# Patient Record
Sex: FEMALE | Race: WHITE | NOT HISPANIC OR LATINO | URBAN - METROPOLITAN AREA
[De-identification: names, ages, dates, MRNs, and addresses within clinical notes are randomized per-mention and may not be internally consistent; named-entity substitution may affect disease eponyms.]

---
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
## 2018-02-11 ENCOUNTER — COMMUNICATION - RIVER FALLS (OUTPATIENT)
Dept: FAMILY MEDICINE | Facility: CLINIC | Age: 18
End: 2018-02-11

## 2018-02-11 ENCOUNTER — OFFICE VISIT - RIVER FALLS (OUTPATIENT)
Dept: FAMILY MEDICINE | Facility: CLINIC | Age: 18
End: 2018-02-11

## 2018-02-11 LAB
CREAT SERPL-MCNC: 0.75 MG/DL (ref 0.57–1.11)
GLUCOSE BLD-MCNC: 96 MG/DL (ref 65–100)

## 2018-02-12 ENCOUNTER — COMMUNICATION - RIVER FALLS (OUTPATIENT)
Dept: FAMILY MEDICINE | Facility: CLINIC | Age: 18
End: 2018-02-12

## 2018-05-02 ENCOUNTER — OFFICE VISIT - RIVER FALLS (OUTPATIENT)
Dept: FAMILY MEDICINE | Facility: CLINIC | Age: 18
End: 2018-05-02

## 2018-05-02 ASSESSMENT — MIFFLIN-ST. JEOR: SCORE: 2146.15

## 2022-02-11 VITALS
BODY MASS INDEX: 41.37 KG/M2 | WEIGHT: 289 LBS | DIASTOLIC BLOOD PRESSURE: 74 MMHG | HEART RATE: 95 BPM | SYSTOLIC BLOOD PRESSURE: 124 MMHG | TEMPERATURE: 98.7 F | OXYGEN SATURATION: 98 % | HEIGHT: 70 IN

## 2022-02-11 VITALS
DIASTOLIC BLOOD PRESSURE: 70 MMHG | HEIGHT: 70 IN | WEIGHT: 280 LBS | WEIGHT: 275.4 LBS | HEIGHT: 70 IN | HEIGHT: 70 IN | DIASTOLIC BLOOD PRESSURE: 72 MMHG | HEART RATE: 84 BPM | DIASTOLIC BLOOD PRESSURE: 78 MMHG | BODY MASS INDEX: 39.43 KG/M2 | TEMPERATURE: 97.7 F | HEART RATE: 82 BPM | WEIGHT: 276 LBS | SYSTOLIC BLOOD PRESSURE: 124 MMHG | HEIGHT: 70 IN | SYSTOLIC BLOOD PRESSURE: 124 MMHG | HEART RATE: 66 BPM | SYSTOLIC BLOOD PRESSURE: 116 MMHG | BODY MASS INDEX: 39.51 KG/M2 | BODY MASS INDEX: 39.51 KG/M2 | HEART RATE: 90 BPM | OXYGEN SATURATION: 98 % | WEIGHT: 276 LBS | BODY MASS INDEX: 40.23 KG/M2 | WEIGHT: 276 LBS | HEIGHT: 70 IN | SYSTOLIC BLOOD PRESSURE: 120 MMHG | HEIGHT: 70 IN | HEART RATE: 72 BPM | WEIGHT: 281 LBS | HEART RATE: 68 BPM | DIASTOLIC BLOOD PRESSURE: 72 MMHG | SYSTOLIC BLOOD PRESSURE: 124 MMHG | BODY MASS INDEX: 39.51 KG/M2 | BODY MASS INDEX: 40.09 KG/M2 | TEMPERATURE: 97.9 F | DIASTOLIC BLOOD PRESSURE: 60 MMHG | SYSTOLIC BLOOD PRESSURE: 102 MMHG | TEMPERATURE: 98.1 F | DIASTOLIC BLOOD PRESSURE: 66 MMHG

## 2022-02-11 VITALS
HEIGHT: 70 IN | DIASTOLIC BLOOD PRESSURE: 70 MMHG | SYSTOLIC BLOOD PRESSURE: 121 MMHG | HEART RATE: 97 BPM | SYSTOLIC BLOOD PRESSURE: 144 MMHG | SYSTOLIC BLOOD PRESSURE: 118 MMHG | BODY MASS INDEX: 41.37 KG/M2 | SYSTOLIC BLOOD PRESSURE: 128 MMHG | HEIGHT: 70 IN | HEART RATE: 88 BPM | BODY MASS INDEX: 41.37 KG/M2 | BODY MASS INDEX: 41.52 KG/M2 | TEMPERATURE: 98.2 F | OXYGEN SATURATION: 98 % | TEMPERATURE: 97.3 F | TEMPERATURE: 97.4 F | WEIGHT: 289 LBS | DIASTOLIC BLOOD PRESSURE: 84 MMHG | HEIGHT: 70 IN | WEIGHT: 290 LBS | DIASTOLIC BLOOD PRESSURE: 68 MMHG | HEART RATE: 88 BPM | HEART RATE: 72 BPM | WEIGHT: 290 LBS | WEIGHT: 289 LBS | DIASTOLIC BLOOD PRESSURE: 80 MMHG | BODY MASS INDEX: 41.61 KG/M2

## 2022-02-11 VITALS
SYSTOLIC BLOOD PRESSURE: 112 MMHG | TEMPERATURE: 98.1 F | BODY MASS INDEX: 39.51 KG/M2 | HEIGHT: 70 IN | DIASTOLIC BLOOD PRESSURE: 76 MMHG | TEMPERATURE: 98.6 F | HEART RATE: 76 BPM | WEIGHT: 284 LBS | DIASTOLIC BLOOD PRESSURE: 80 MMHG | OXYGEN SATURATION: 98 % | HEART RATE: 72 BPM | BODY MASS INDEX: 40.75 KG/M2 | WEIGHT: 276 LBS | SYSTOLIC BLOOD PRESSURE: 142 MMHG

## 2022-02-11 VITALS
SYSTOLIC BLOOD PRESSURE: 114 MMHG | BODY MASS INDEX: 41.95 KG/M2 | WEIGHT: 293 LBS | DIASTOLIC BLOOD PRESSURE: 68 MMHG | BODY MASS INDEX: 42.99 KG/M2 | HEART RATE: 74 BPM | DIASTOLIC BLOOD PRESSURE: 88 MMHG | SYSTOLIC BLOOD PRESSURE: 140 MMHG | WEIGHT: 293 LBS | HEART RATE: 76 BPM | TEMPERATURE: 97.9 F | HEIGHT: 70 IN

## 2022-02-16 NOTE — PROGRESS NOTES
Patient:   JESUS YU            MRN: [id number]            FIN: [id number]               Age:   16 years     Sex:  Female     :  2000   Associated Diagnoses:   None   Author:   Ted Loya MD      Visit Information      Date of Service: 2017 02:06 pm  Performing Location: [facility]  Encounter#: [id number]      Primary Care Provider (PCP):  Not recorded.      Referring Provider:  No referring provider recorded for selected visit.      Chief Complaint   2017 2:19 PM CST     F/u Depression        History of Present Illness   Pt has been taking 40 mg citalopram for 6 wks and has noted no improvement. Has some passive death wish thoughts occasionally which are stable, but no active plan. She has started binge drinking on the weekends. She is still having a lot of trouble sleeping.       Review of Systems         No gallo  No hallucinations  No clear signs of ADD      Health Status   Allergies:    Allergic Reactions (Selected)  No Known Medication Allergies   Medications:  (Selected)   Prescriptions  Prescribed  Effexor XR 75 mg oral capsule, extended release: 1 cap(s) ( 75 mg ), PO, Daily, # 30 cap(s), 0 Refill(s), Type: Maintenance, Pharmacy: [facility] #[id number], 1 cap(s) po daily  citalopram 20 mg oral tablet: 2 tab(s) ( 40 mg ), po, daily, # 60 tab(s), 0 Refill(s), Type: Soft Stop, Pharmacy: [facility] #[id number], 2 tab(s) po daily  ramelteon 8 mg oral tablet: 1 tab(s) ( 8 mg ), po, hs, PRN: as needed for insomnia, # 30 tab(s), 0 Refill(s), Type: Maintenance, Pharmacy: [facility] #[id number], 1 tab(s) po hs,PRN:as needed for insomnia   Problem list:    All Problems  Obesity / SNOMED CT 0062646450 / Probable  Seasonal allergies / SNOMED CT 527760932 / Confirmed      Histories   Past Medical History:    Active  Seasonal allergies (219504613)  Resolved  History of chicken pox ([id number]--[id number]):  Resolved.   Family History:    Multiple sclerosis  Mother      Procedure history:    Arthroscopic repair of superior labrum anterior to posterior tear ([id number]) in the month of 8/2016 at 16 Years.  Comments:  10/18/2016 4:17 PM - Javon MARTINESJulio  Bilaterally  Extraction of wisdom tooth ([id number]).   Social History:        Alcohol Assessment: Denies Alcohol Use            Never      Tobacco Assessment: Denies Tobacco Use            Never smoker      Substance Abuse Assessment: Denies Substance Abuse            Never      Employment and Education Assessment            Student                     Comments:                      10/18/2016 - Julio Alejandro CMA                     Freshman at [employer]      Exercise and Physical Activity Assessment: Regular exercise            Exercise frequency: 5-6 times/week.  Exercise type: Track & Field at [employer].        Physical Examination   Vital Signs   2/7/2017 2:19 PM CST Peripheral Pulse Rate 66 bpm    Systolic Blood Pressure 124 mmHg    Diastolic Blood Pressure 72 mmHg    Mean Arterial Pressure 89 mmHg      Measurements from flowsheet : Measurements   2/7/2017 2:19 PM CST Height Measured - Standard 70 in    Weight Measured - Standard 281.0 lb    BSA 2.51 m2    Body Mass Index 40.31 kg/m2    Body Mass Index Percentile 98.67      Psych - appropriate affect, calm , and cooperative      Impression and Plan   Depression - refractory to SSRI treatment.  - countinue counseling. She is going weekly  - Discussed medicatino options for patient. Discussed switching to mirtazapine to help with sleep but discussed likely adverse effect of wt gain which pt thinks would be unacceptbale side effect for her. Which would also limit the use of augmentation with an atypical antipsychotic,.   - will switch to venlafaxine. Decrease citalopram to 20 mg daily over the next couple of weeks 2while taking 75 mg effexor XR  - f/u 2 wks  - try ramelteon for sleep qhs.   - dsicussed imporatnce of alchool avoidance.       Professional Services   Counseling  Summary:  This was a 20 minute visit with greater than 50% of that time spent counseling the patient.    Counseling included treatment options, and lifestyle changes.

## 2022-02-16 NOTE — PROGRESS NOTES
Patient:   JESUS YU            MRN: [id number]            FIN: [id number]               Age:   16 years     Sex:  Female     :  2000   Associated Diagnoses:   Concussion   Author:   Riky Pineda MD      Chief Complaint   2017 10:13 AM CST   Here today stating that although she had been improving sx are worsening now.  Headache, some light and sound sensitivity from time to time.      History of Present Illness   see chief complaint as noted above and confirmed with the patient     16 year old female here today following up with concussion that occured about two weeks ago. She was getting much better, her headaches were slowly getting better. On Monday she felt really good her headaches were pretty much gone, [facility] and did okay. On Tuesday she was told by her  if she can go the day symptom free then she could start biking soon, on Tuesday night she was with her friends and she got a bad headache so she went to lie down, when she did go to sleep she say's she slept for seventeen hours,  then on Wednesday night she only slept for about one hour. She feels the headaches are still present, she has been taking aleve and says this gives her some relief for a few hours.       Review of Systems   Constitutional:  No fever.    Eye:  No double vision, No visual disturbances.    Ear/Nose/Mouth/Throat:  No sore throat.    Respiratory:  No cough.    Gastrointestinal:  No nausea, No vomiting, No diarrhea.    Integumentary:  No rash.    Neurologic:  Alert and oriented X4, Headache, No abnormal balance.       Health Status   Allergies:    Allergic Reactions (Selected)  No Known Medication Allergies   Medications:  (Selected)   Prescriptions  Prescribed  Effexor  mg oral capsule, extended release: 1 cap(s) ( 150 mg ), PO, Daily, # 30 cap(s), 0 Refill(s), Type: Maintenance, Pharmacy: [facility] #[id number], 1 cap(s) po daily  ramelteon 8 mg oral tablet: 1 tab(s) ( 8 mg ), po, hs,  PRN: as needed for insomnia, # 30 tab(s), 0 Refill(s), Type: Maintenance, Pharmacy: [facility] #[id number], 1 tab(s) po hs,PRN:as needed for insomnia   Problem list:    All Problems  Obesity / SNOMED CT 2062727719 / Probable  Seasonal allergies / SNOMED CT 967057453 / Confirmed  Resolved: History of chicken pox / SNOMED CT F022ZAX7-8926--[id number]      Histories   Past Medical History:    Active  Seasonal allergies (654876155)  Resolved  History of chicken pox ([id number]--[id number]):  Resolved.   Family History:    Multiple sclerosis  Mother     Procedure history:    Arthroscopic repair of superior labrum anterior to posterior tear (9817601360) in the month of 8/2016 at 16 Years.  Comments:  10/18/2016 4:17 PM - Julio Alejandro CMA  Bilaterally  Extraction of wisdom tooth ([id number]).   Social History:        Alcohol Assessment: Denies Alcohol Use            Never      Tobacco Assessment: Denies Tobacco Use            Never smoker      Substance Abuse Assessment: Denies Substance Abuse            Never      Employment and Education Assessment            Student                     Comments:                      10/18/2016 - Julio Alejandro CMA                     Freshman at [employer]      Exercise and Physical Activity Assessment: Regular exercise            Exercise frequency: 5-6 times/week.  Exercise type: Track & Field at college.        Physical Examination   Vital Signs   2/23/2017 10:13 AM CST Temperature Temporal 97.7 DegF    Apical Heart Rate 84 bpm    Systolic Blood Pressure 116 mmHg    Diastolic Blood Pressure 60 mmHg    Mean Arterial Pressure 79 mmHg      Measurements from flowsheet : Measurements   2/23/2017 10:13 AM CST Height Measured - Standard 70 in    Weight Measured - Standard 276 lb    BSA 2.48 m2    Body Mass Index 39.6 kg/m2    Body Mass Index Percentile 98.55      General:  Alert and oriented, No acute distress.    Eye:  Pupils are equal, round and reactive to light,  Normal conjunctiva.    HENT:  Oral mucosa is moist.    Neck:  Supple.    Respiratory:  Respirations are non-labored.    Cardiovascular:  Normal rate, Regular rhythm, No edema.    Gastrointestinal:  Non-distended.    Musculoskeletal:  Normal gait.    Integumentary:  Warm, No rash.    Psychiatric:  Cooperative, Appropriate mood & affect, Normal judgment.       Review / Management   Results review      Impression and Plan       Diagnosis     Concussion ([id number]-NK S06.0X).     Course:  main problem is headache.  unsure if she has acute headache from insomnia or is concussion related  reviewed activity level and she will set llimits.    Plan:  Will send in Prednisone for 5 days, this will help with any inflammation.     Neurontin will be sent in as well, take this at night, it will begin to work in a few days.     Please return on Monday or Tuesday if things are worsening or not getting any better.     .    Meena GARCIA Medical Assistant acted solely as a scribe for, and in presence of Dr. Riky Pineda who performed the services.

## 2022-02-16 NOTE — PROGRESS NOTES
Patient:   JESUS YU            MRN: [id number]            FIN: [id number]               Age:   16 years     Sex:  Female     :  2000   Associated Diagnoses:   None   Author:   Ted Loya MD      Visit Information      Date of Service: 2017 03:44 pm  Performing Location: [facility]  Encounter#: [id number]      Primary Care Provider (PCP):  Ted Loya MD    NPI# [id number]      Referring Provider:  No referring provider recorded for selected visit.      Chief Complaint   2017 3:48 PM CST    Pt here for f/u medication for anxiety and depression.        History of Present Illness   CC as above and reviewed w  patient   She note some improvement. School is going better. Had a concusison, is improving. Sleep is a little improved in the sense that with ramelteon she now seems alessandro falling asleep, but she gets woken up easily by her roommate and then has trouble falling back again.      Review of Systems         Psych - denies increase in suicidal thoughts      Health Status   Allergies:    Allergic Reactions (Selected)  No Known Medication Allergies   Medications:  (Selected)   Prescriptions  Prescribed  Effexor  mg oral capsule, extended release: 1 cap(s) ( 150 mg ), PO, Daily, # 30 cap(s), 0 Refill(s), Type: Maintenance, Pharmacy: [facility] #[id number], 1 cap(s) po daily  ramelteon 8 mg oral tablet: 1 tab(s) ( 8 mg ), po, hs, PRN: as needed for insomnia, # 30 tab(s), 0 Refill(s), Type: Maintenance, Pharmacy: [facility] #[id number], 1 tab(s) po hs,PRN:as needed for insomnia   Problem list:    All Problems  Obesity / SNOMED CT 2046093468 / Probable  Seasonal allergies / SNOMED CT 940942314 / Confirmed      Histories   Past Medical History:    Active  Seasonal allergies (855235012)  Resolved  History of chicken pox ([id number]--[id number]):  Resolved.   Family History:    Multiple sclerosis  Mother     Procedure history:    Arthroscopic repair of  superior labrum anterior to posterior tear (3309253667) in the month of 8/2016 at 16 Years.  Comments:  10/18/2016 4:17 PM - Julio Alejandro CMA  Bilaterally  Extraction of wisdom tooth (642097526).   Social History:        Alcohol Assessment: Denies Alcohol Use            Never      Tobacco Assessment: Denies Tobacco Use            Never smoker      Substance Abuse Assessment: Denies Substance Abuse            Never      Employment and Education Assessment            Student                     Comments:                      10/18/2016 - Julio Alejandro CMA                     Freshman at [employer]      Exercise and Physical Activity Assessment: Regular exercise            Exercise frequency: 5-6 times/week.  Exercise type: Track & Field at [employer].        Physical Examination   Vital Signs   2/21/2017 3:48 PM CST Temperature Tympanic 97.9 DegF    Peripheral Pulse Rate 82 bpm    Systolic Blood Pressure 102 mmHg    Diastolic Blood Pressure 66 mmHg    Mean Arterial Pressure 78 mmHg      Measurements from flowsheet : Measurements   2/21/2017 3:48 PM CST Height Measured - Standard 70 in    Weight Measured - Standard 276 lb    BSA 2.48 m2    Body Mass Index 39.6 kg/m2    Body Mass Index Percentile 98.55      Gen - appears well  Psych - better affect, fair judgement, fiar insight      Impression and Plan   Depression  Insomnia    - continue rmalteon. Discussed earplugs  - stop citalopram, increase effexor to 150 mg daily. f/u 2-4 wks.

## 2022-02-16 NOTE — PROGRESS NOTES
Patient:   JESUS YU            MRN: [id number]            FIN: [id number]               Age:   16 years     Sex:  Female     :  2000   Associated Diagnoses:   Concussion   Author:   Riky Pineda MD      Chief Complaint   2017 3:47 PM CST    Here today to be evaluated for concussion.  [place] at school recommendedyou be evaluated. head injury 2016.  injury occured slipping on ice and hit railing.      History of Present Illness   see chief complaint as noted above and confirmed with the patient     16 year old female here today with concern of possible concussion. She slipped and fell on ice on Tuesday, she was evaluated by her brett and he said she had a concussion. She has been going to school yet has not been joining in on sports due to her concussion. She was told she needs to be rechecked by a physician, she has complaints of headache, exhaustion, she feels she could sleep for a very long time. She had a previous concussion in her gisel year of high school. She also recently hurt her ankle so has not been doing activites in class for a few day's now. She has been going to school and has not taken any days off due to the concussion.       Review of Systems   Constitutional:  Decreased activity, No fever, No weakness.    Eye:  No blurring, No double vision, No visual disturbances.    Ear/Nose/Mouth/Throat:  No nasal congestion, No sore throat.    Respiratory:  No cough, No wheezing.    Cardiovascular:  No chest pain.    Gastrointestinal:  Nausea, Had some nausea on saturday-has not had much since , No vomiting, No diarrhea.    Musculoskeletal:  No neck pain, No joint pain, No muscle pain.    Integumentary:  No rash.    Neurologic:  Alert and oriented X4, Headache, No abnormal balance, No confusion.       Health Status   Allergies:    Allergic Reactions (Selected)  No Known Medication Allergies   Medications:  (Selected)   Prescriptions  Prescribed  Effexor XR 75 mg oral capsule,  extended release: 1 cap(s) ( 75 mg ), PO, Daily, # 30 cap(s), 0 Refill(s), Type: Maintenance, Pharmacy: [facility] #[id number], 1 cap(s) po daily  citalopram 20 mg oral tablet: 2 tab(s) ( 40 mg ), po, daily, # 60 tab(s), 0 Refill(s), Type: Soft Stop, Pharmacy: [facility] #[id number], 2 tab(s) po daily  ramelteon 8 mg oral tablet: 1 tab(s) ( 8 mg ), po, hs, PRN: as needed for insomnia, # 30 tab(s), 0 Refill(s), Type: Maintenance, Pharmacy: [facility] #[id number], 1 tab(s) po hs,PRN:as needed for insomnia   Problem list:    All Problems  Obesity / SNOMED CT 8216283895 / Probable  Seasonal allergies / SNOMED CT 809346993 / Confirmed  Resolved: History of chicken pox / SNOMED CT L701DMM8-1126--[id number]      Histories   Past Medical History:    Active  Seasonal allergies (706729365)  Resolved  History of chicken pox ([id number]--[id number]):  Resolved.   Family History:    Multiple sclerosis  Mother     Procedure history:    Arthroscopic repair of superior labrum anterior to posterior tear (4469067942) in the month of 8/2016 at 16 Years.  Comments:  10/18/2016 4:17 PM - Julio Alejandro CMA  Bilaterally  Extraction of wisdom tooth (071265939).   Social History:        Alcohol Assessment: Denies Alcohol Use            Never      Tobacco Assessment: Denies Tobacco Use            Never smoker      Substance Abuse Assessment: Denies Substance Abuse            Never      Employment and Education Assessment            Student                     Comments:                      10/18/2016 - Julio Alejandro CMA                     Freshman at [employer]      Exercise and Physical Activity Assessment: Regular exercise            Exercise frequency: 5-6 times/week.  Exercise type: Track & Field at college.        Physical Examination   Vital Signs   2/13/2017 3:47 PM CST Temperature Temporal 98.1 DegF    Peripheral Pulse Rate 68 bpm    Systolic Blood Pressure 120 mmHg    Diastolic Blood Pressure 72 mmHg    Mean  Arterial Pressure 88 mmHg      Measurements from flowsheet : Measurements   2/13/2017 3:47 PM CST Height Measured - Standard 70 in    Weight Measured - Standard 280 lb    BSA 2.5 m2    Body Mass Index 40.17 kg/m2    Body Mass Index Percentile 98.64      General:  Alert and oriented, No acute distress.    Eye:  Pupils are equal, round and reactive to light, Normal conjunctiva, Vision unchanged.    HENT:  Oral mucosa is moist.    Neck:  Supple.    Respiratory:  Respirations are non-labored.    Cardiovascular:  Normal rate, Regular rhythm, No edema.    Gastrointestinal:  Non-distended.    Musculoskeletal:  Normal range of motion, Normal strength, Normal gait.    Integumentary:  Warm, No rash.    Neurologic:  Alert, Oriented, Normal sensory, Normal motor function.    Psychiatric:  Cooperative, Appropriate mood & affect, Normal judgment.       Review / Management   Results review      Impression and Plan       Diagnosis     Concussion (NFZ11-EK S06.0X).     Plan:  Do have a concussion, need to rest the brain.     Should refrain from cell phone usage, reading, watching tv, going to class, need to just rest the brain.     Please drink plenty of water, get sleep and rest, the brain is injured so the best way for it to heal is to let it rest.     No class until re-evaluation in clinic on Thursday.     Please return sooner if needed. .    Summary:  Instructed patient to refrain from activites, needs to rest, no cell phone usage, no television, no reading, she verbalized understanding.     Informed brain needs to rest to recover, drink plenty of fluids, will return on Thursday for a re-evaluation.     Was taken off of school until she returns on Thursday for a recheck.     Did offer some nausea medication-she declines at this time as the nausea seem's to be improving.     Concussion education printed for patient .    I, Meena Grimaldo Medical Assistant acted solely as a scribe for, and in presence of Dr. Riky Pineda  who performed the services.

## 2022-02-16 NOTE — PROGRESS NOTES
Patient:   JESUS YU            MRN: [id number]            FIN: [id number]               Age:   17 years     Sex:  Female     :  2000   Associated Diagnoses:   Depression; Migraines; Anxiety   Author:   Riky Pineda MD      Chief Complaint   2017 9:07 AM CST    f/u migraines      History of Present Illness   see chief complaint as noted above and confirmed with the patient    17 : 17 year old female refered by  with discussion of sleep. For years she has had troubles falling asleep. She typically goes to bed around 10 or 11pm and wakes up at 8 or 9am. On the weekends she usually goes to bed around 10 or 11pm and wakes up around 10am. She say's it is a good night if she can get to bed within an hour, once she fall's asleep she usually can stay asleep. Say's her roommate will wake her up if she is too loud and once she is woken up she will stay up. She denies snoring, denies gasping for air, denies daytime naps. She has tried some medications such as Ramelteon and found this did help some in the beginning but once she began taking it for a while she felt it was not helping to aid her to get to bed. She makes sure she is comfortable, makes sure lights are off, does not sit and play on phone, she does not feel worried when she goes to sleep.         17: She feel her sleep is improving.  She is falling asleep better but does wake up frequent during the night. She is having problems with her roommate and this is stressing her out a lot. Her room mate does things at night that wake her up. The sleep she does get is poor.  She is waking up with migraines. She takes tylenol and that helps for the smaller migraines but not all of them. They are frequent. They do affect her vision. She does have an ankle injury but doesn't feel that pain is affecting her sleep.    17: She is following up. She is still having problem with her ankle and is trying to get in with someone at home to  have it looked at.  Does some yoga and ab workouts with her track team everyday.  She was referred to do a sleep study due to the migraines she is getting in the morning. Her insurance would not let her do a study at home and requires a in office one. She has been called to set up but has not returned their call yet. Her headaches have improved since her stress level has gone down. She was having a lot of stress due to her roommate. They have been working things out and she will be leaving. This has helped with her anxiety. Her depression has been doing a lot better. She needs to have a note filled out for the college to state she needs a single room so they will not assign a new  roommate.      Review of Systems   Constitutional:  No fever, No chills.    Ear/Nose/Mouth/Throat:  Negative.    Respiratory:  Negative.    Cardiovascular:  Negative.    Gastrointestinal:  Negative.    Genitourinary:  Negative.    Musculoskeletal:  ankle pain.    Integumentary:  No rash.    Neurologic:  Headache.    Psychiatric:  Anxiety, Depression, Sleeping problems.              Health Status   Allergies:    Allergic Reactions (Selected)  No Known Medication Allergies   Medications:  (Selected)   Prescriptions  Prescribed  DULoxetine 60 mg oral delayed release capsule: 1 cap(s) ( 60 mg ), po, daily, # 30 cap(s), 0 Refill(s), Type: Maintenance, Pharmacy: [id number], 1 cap(s) po daily  Imitrex 100 mg oral tablet: 1 tab(s) ( 100 mg ), PO, Once, PRN: for migraine headache, # 9 tab(s), 2 Refill(s), Type: Soft Stop, Pharmacy: [id number], 1 tab(s) po once,PRN:for migraine headache  Neurontin 300 mg oral capsule: 1 cap(s) ( 300 mg ), po, hs, # 30 cap(s), 1 Refill(s), Type: Maintenance, Pharmacy: [id number], 1 cap(s) po hs  Documented Medications  Documented  traMADol 50 mg oral tablet: 1 tab(s) ( 50 mg ), PO, q4hr, PRN: for pain, # 60 tab(s), 0 Refill(s), Type: Maintenance   Problem list:    All  Problems  Seasonal allergies / SNOMED CT 315253525 / Confirmed  Obesity / SNOMED CT 6518229866 / Probable  Resolved: History of chicken pox / SNOMED CT [id number]--[id number]      Histories   Past Medical History:    Active  Seasonal allergies ([id number])  Resolved  History of chicken pox ([id number]):  Resolved.   Family History:    Multiple sclerosis  Mother     Procedure history:    Arthroscopic repair of superior labrum anterior to posterior tear ([id number]) in the month of 8/2016 at 16 Years.  Comments:  10/18/2016 4:17 PM - Julio Alejandro CMA  Bilaterally  Extraction of wisdom tooth ([id number]).   Social History:        Alcohol Assessment            Current, 1-2 times per week, 2 drinks/episode average.  3 drinks/episode maximum.      Tobacco Assessment            Never smoker      Substance Abuse Assessment            Never      Employment and Education Assessment            Student                     Comments:                      10/18/2016 - Julio Alejandro CMA                     Freshman at [employer]      Exercise and Physical Activity Assessment            Exercise frequency: 5-6 times/week.  Exercise type: Track & Field at college.        Physical Examination   Vital Signs   12/5/2017 9:07 AM CST Peripheral Pulse Rate 74 bpm    Systolic Blood Pressure 114 mmHg    Diastolic Blood Pressure 68 mmHg    Mean Arterial Pressure 83 mmHg      Measurements from flowsheet : Measurements   12/5/2017 9:07 AM CST Height Measured - Standard 70 in    Weight Measured - Standard 296.0 lb    BSA 2.57 m2    Body Mass Index 42.47 kg/m2    Body Mass Index Percentile 98.73      General:  Alert and oriented, No acute distress.    Eye:  Pupils are equal, round and reactive to light, Normal conjunctiva.    HENT:  Oral mucosa is moist.    Neck:  Supple.    Respiratory:  Respirations are non-labored.    Cardiovascular:  Normal rate, Regular rhythm, No edema.    Gastrointestinal:  Non-distended.     Musculoskeletal:  Normal gait.    Integumentary:  Warm, No rash.    Psychiatric:  Cooperative, Appropriate mood & affect, Normal judgment.       Review / Management   Results review      Impression and Plan   Diagnosis     Depression (MDA96-YB F32.9).     Migraines (QCY89-LW G43.909).     Anxiety (XIM44-MS F41.9).     Plan:  Discussed getting her a referral for a orthopedic for her ankle pain but she declines because she will be going home for a month and will try to get in with her orthopedic at home. Form for her room was filled out. Discussed having her make an appointment for her sleep study. Will have her continue current medications at current doses.  [facility], acted solely as a scribe for, and in the presence of Dr. Riky Pineda who performed the service..

## 2022-02-16 NOTE — PROGRESS NOTES
Patient:   JESUS YU            MRN: [id number]            FIN: [id number]               Age:   16 years     Sex:  Female     :  2000   Associated Diagnoses:   None   Author:   Ted Loya MD      Visit Information      Date of Service: 2017 02:12 pm  Performing Location: [facility]  Encounter#: [id number]      Primary Care Provider (PCP):  Ted Loya MD    NPI# [id number]      Referring Provider:  No referring provider recorded for selected visit.      Chief Complaint   3/24/2017 2:17 PM CDT    F/u medication check. No concerns        History of Present Illness   CC as above and reviewed w  patient   Here for mental health f/u. Overall thinks things are improved. In a week half days she feels bad half days ar ereally good. Sleeping better on ramelteon. Decreased suicidal ideation  Notes concussion symptoms are improved.      Review of Systems         Psych - notes that her suicidal ideation has decreased from last semester.      Health Status   Allergies:    Allergic Reactions (Selected)  No Known Medication Allergies   Medications:  (Selected)   Prescriptions  Prescribed  Effexor  mg oral capsule, extended release: 1 cap(s) ( 150 mg ), PO, Daily, # 30 cap(s), 2 Refill(s), Type: Maintenance, Pharmacy: [id number], 1 cap(s) po daily  Neurontin 300 mg oral capsule: 1 cap(s) ( 300 mg ), po, hs, # 30 cap(s), 0 Refill(s), Type: Maintenance, Pharmacy: [id number], 1 cap(s) po hs  ramelteon 8 mg oral tablet: 1 tab(s) ( 8 mg ), po, hs, PRN: as needed for insomnia, # 30 tab(s), 2 Refill(s), Type: Maintenance, Pharmacy: [id number], 1 tab(s) po hs,PRN:as needed for insomnia   Problem list:    All Problems  Obesity / SNOMED CT 4670050862 / Probable  Seasonal allergies / SNOMED CT 258543279 / Confirmed      Histories   Past Medical History:    Active  Seasonal allergies ([id number])  Resolved  History of chicken pox  ([id number]):  Resolved.   Family History:    Multiple sclerosis  Mother     Procedure history:    Arthroscopic repair of superior labrum anterior to posterior tear ([id number]) in the month of 8/2016 at 16 Years.  Comments:  10/18/2016 4:17 PM - Javon MARTINESJulio  Bilaterally  Extraction of wisdom tooth ([id number]).   Social History:        Alcohol Assessment: Denies Alcohol Use            Never      Tobacco Assessment: Denies Tobacco Use            Never smoker      Substance Abuse Assessment: Denies Substance Abuse            Never      Employment and Education Assessment            Student                     Comments:                      10/18/2016 - Julio Alejandro CMA                     Freshman at [employer]      Exercise and Physical Activity Assessment: Regular exercise            Exercise frequency: 5-6 times/week.  Exercise type: Track & Field at college.        Physical Examination   Vital Signs   3/24/2017 2:17 PM CDT Temperature Tympanic 98.6 DegF    Peripheral Pulse Rate 76 bpm    Systolic Blood Pressure 142 mmHg  HI    Diastolic Blood Pressure 76 mmHg    Mean Arterial Pressure 98 mmHg    BP Site Right arm    BP Method Manual    Oxygen Saturation 98 %      Measurements from flowsheet : Measurements   3/24/2017 2:17 PM CDT Height Measured - Standard 70 in    Weight Measured - Standard 276 lb    BSA 2.48 m2    Body Mass Index 39.6 kg/m2    Body Mass Index Percentile 98.53      Gen - apepars well  Psych - improved mood, affect.      Impression and Plan   Depression, anxiety  - continue effexor and ramelteon at current doses. Follow up in 3 months. Continue counseling.     concussion  - improving working with

## 2022-02-16 NOTE — PROGRESS NOTES
Chief Complaint    Pt here for anxiety and depression med check. Still having a hard time sleeping.  History of Present Illness      Patient here for follow-up on her depression.  She is doing fairly well.  Her summer was okay.  She was feeling depressed early on in the summer but is feeling better now.  She is excited about her semester.  She stopped taking Effexor several months ago, and feels that she is stable off of the medications.  One area that is continuing to plague her his sleep.  She is following good sleep hygiene likely discussed, but she is still struggling with being unable to fall asleep at night.  When she does fall asleep she does sleep well.  But because of the prolonged period of time it takes her to fall asleep she is only getting 3 or 4 hours per night.  We have gone over sleep hygiene quite a bit in clinic.  She has tried numerous medications which have included trazodone, Ambien, and ramelteon, without relief.  Physical Exam   Vitals & Measurements    T: 98.2(Tympanic)  HR: 88(Peripheral)  BP: 144/80     HT: 70 in  WT: 289 lb  BMI: 41.46       Overall she is in no apparent distress.  Assessment/Plan       Depression         It seems that she is stable off her medications.  She has a supply of Effexor to use if she feels like she needs to go back on them, she will give me a call.  We did note that her blood pressure was slightly elevated but she notes that she checks this at home and it is generally normal so not too concerned about her use of Effexor.        In terms of her sleep, I am not sure how to proceed with helping her.  As above pharmacologic therapy has not been very helpful for her.  She is tried trazodone, Ambien, and ramelteon without relief.  We have gone over sleep hygiene.  I have discussed with her trying some other medications such as tricyclic antidepressant, or mirtazapine, however I think the issues with weight gain are significant which is not something that we want to  cause.  I will refer her to Dr. Pineda for his sleep expertise.         Ordered:          82062 office outpatient visit 15 minutes (Charge), Quantity: 1, Depression          Return to Clinic (Request), Return in 2 weeks to follow up depression          Return to Clinic (Request), Return in 1 week                Immunization due         Ordered:          influenza virus vaccine, inactivated, 0.5 mL, im, once                Orders:         XR Wrist AP/Lat Left (Request), Left wrist injury  Problem List/Past Medical History    Ongoing     Obesity     Seasonal allergies    Historical     History of chicken pox  Procedure/Surgical History     Arthroscopic repair of superior labrum anterior to posterior tear (08/2016)     Extraction of wisdom tooth  Medications    Effexor  mg oral capsule, extended release, 150 mg= 1 cap(s), po, daily, 4 refills    Neurontin 300 mg oral capsule, 300 mg= 1 cap(s), po, hs    ramelteon 8 mg oral tablet, 8 mg= 1 tab(s), po, hs, PRN, 4 refills  Allergies    No Known Medication Allergies  Social History    Smoking Status - 09/12/2017     Never smoker     Alcohol - 05/03/2017      Current     Employment and Education - 05/03/2017      Student     Exercise and Physical Activity - Regular exercise, 05/03/2017      Exercise frequency: 5-6 times/week. Exercise type: Track & Field at college.     Substance Abuse - Denies Substance Abuse, 05/03/2017      Never     Tobacco - Denies Tobacco Use, 05/03/2017      Never smoker  Family History    Multiple sclerosis: Mother.  Immunizations      Vaccine Date Status      influenza virus vaccine, inactivated 09/12/2017 Given      Hep A, pediatric/adolescent 09/27/2016 Recorded      influenza 09/27/2016 Recorded      human papillomavirus vaccine 09/27/2016 Recorded      meningococcal group B vaccine 05/12/2016 Recorded      Hep A, pediatric/adolescent 04/11/2016 Recorded      meningococcal group B vaccine 04/11/2016 Recorded      human papillomavirus  vaccine 04/11/2016 Recorded      meningococcal conjugate vaccine 07/24/2014 Recorded      meningococcal conjugate vaccine 08/21/2009 Recorded      tetanus/diphth/pertuss (Tdap) adult/adol 08/21/2009 Recorded      varicella 12/18/2007 Recorded      DTaP 04/22/2002 Recorded      MMR (measles/mumps/rubella) 04/22/2002 Recorded      IPV 04/22/2002 Recorded      DTaP 07/19/1999 Recorded      Hib (HbOC) 07/19/1999 Recorded      varicella 04/15/1999 Recorded      MMR (measles/mumps/rubella) 04/15/1999 Recorded      DTaP 1998 Recorded      hepatitis B pediatric vaccine 1998 Recorded      IPV 1998 Recorded      Hib (HbOC) 1998 Recorded      DTaP 1998 Recorded      IPV 1998 Recorded      Hib (HbOC) 1998 Recorded      DTaP 1998 Recorded      hepatitis B pediatric vaccine 1998 Recorded      IPV 1998 Recorded      Hib (HbOC) 1998 Recorded      hepatitis B pediatric vaccine 1998 Recorded  Lab Results      Results (Last 90 days)      No results located.

## 2022-02-16 NOTE — PROGRESS NOTES
Chief Complaint    Pt c/o left elbow pain. Was lifting weights yesterday when pain started.  History of Present Illness      Patient claims of acute onset of left elbow pain while lifting weights yesterday.  She was doing arm extension exercises when this occurred.  Pain is in the elbow.  No swelling.  No previous history of injuries to the area although has had some chronic pain there.  Physical Exam   Vitals & Measurements    T: 97.4(Tympanic)  HR: 88(Peripheral)  BP: 121/84     HT: 70 in  WT: 290 lb  BMI: 41.61       Overall patient appears well.      Musculoskeletal: Circular disorder and sensory are intact to the left extremity.  Has some tenderness to the posterior elbow.  There is no bony tenderness.  There is no obvious swelling.  Has excellent active range of motion.  Has some pain with extension of the elbow against resistance.  No pain with flexion of the elbow against resistance or supination against resistance.  Assessment/Plan       Elbow injury        Likely a triceps tendon injury.  Recommended rest, ice, gradual return to activities.  Patient requested an x-ray which looks normal.         Ordered:          20728 office outpatient visit 15 minutes (Charge), Quantity: 1, Elbow injury          XR Elbow Min 3 Views Left (Request), Elbow injury           Patient Information     Name:JESUS YU      Address:      [address] [place], [place] [address]     Sex:Female     YOB: 2000     [phone]     MRN:[id number]     FIN:[id number]     Location:[facility]     Date of Service:11/29/2017      Primary Care Physician:       Shalini ROSENBERG, Ted, [phone]  Problem List/Past Medical History    Ongoing     Obesity     Seasonal allergies    Historical     History of chicken pox  Procedure/Surgical History     Arthroscopic repair of superior labrum anterior to posterior tear (08/2016)     Extraction of wisdom tooth  Medications    DULoxetine 60 mg oral delayed  release capsule, 60 mg= 1 cap(s), po, daily    Imitrex 100 mg oral tablet, 100 mg= 1 tab(s), po, once, PRN, 2 refills    Neurontin 300 mg oral capsule, 300 mg= 1 cap(s), po, hs, 1 refills    traMADol 50 mg oral tablet, 50 mg= 1 tab(s), po, q4 hrs, PRN  Allergies    No Known Medication Allergies  Social History    Smoking Status - 11/29/2017     Never smoker     Alcohol - 09/13/2017      Current, 1-2 times per week, 2 drinks/episode average. 3 drinks/episode maximum.     Employment and Education - 09/13/2017      Student     Exercise and Physical Activity - 09/13/2017      Exercise frequency: 5-6 times/week. Exercise type: Track & Field at [employer].     Substance Abuse - 09/13/2017      Never     Tobacco - 09/13/2017      Never smoker  Family History    Multiple sclerosis: Mother.  Immunizations      Vaccine Date Status      influenza virus vaccine, inactivated 09/12/2017 Given      Hep A, pediatric/adolescent 09/27/2016 Recorded      influenza 09/27/2016 Recorded      human papillomavirus vaccine 09/27/2016 Recorded      meningococcal group B vaccine 05/12/2016 Recorded      Hep A, pediatric/adolescent 04/11/2016 Recorded      meningococcal group B vaccine 04/11/2016 Recorded      human papillomavirus vaccine 04/11/2016 Recorded      meningococcal conjugate vaccine 07/24/2014 Recorded      meningococcal conjugate vaccine 08/21/2009 Recorded      tetanus/diphth/pertuss (Tdap) adult/adol 08/21/2009 Recorded      varicella 12/18/2007 Recorded      DTaP 04/22/2002 Recorded      MMR (measles/mumps/rubella) 04/22/2002 Recorded      IPV 04/22/2002 Recorded      DTaP 07/19/1999 Recorded      Hib (HbOC) 07/19/1999 Recorded      varicella 04/15/1999 Recorded      MMR (measles/mumps/rubella) 04/15/1999 Recorded      DTaP 1998 Recorded      hepatitis B pediatric vaccine 1998 Recorded      IPV 1998 Recorded      Hib (HbOC) 1998 Recorded      DTaP 1998 Recorded      IPV 1998 Recorded      Hib  ([facility]) 1998 Recorded      DTaP 1998 Recorded      hepatitis B pediatric vaccine 1998 Recorded      IPV 1998 Recorded      Hib ([facility]) 1998 Recorded      hepatitis B pediatric vaccine 1998 Recorded  Lab Results      Results (Last 90 days)      No results located.

## 2022-02-16 NOTE — PROGRESS NOTES
Patient:   JESUS YU            MRN: [id number]            FIN: [id number]               Age:   17 years     Sex:  Female     :  2000   Associated Diagnoses:   None   Author:   Ted Loya MD      Visit Information      Date of Service: 2017 09:17 am  Performing Location: [facility]  Encounter#: [id number]      Primary Care Provider (PCP):  Ted Loya MD    NPI# [id number]      Referring Provider:  No referring provider recorded for selected visit.      Chief Complaint   5/3/2017 9:22 AM CDT     Here today to f/u on meds.  Taking Ramelteon almost every night and is sleeping better.        History of Present Illness   She's feeling quite a bit better overall. Did well in school this semester and is going to be staying here. PHQ-9 and MARYANN scores have decreased significantly. She's having some family stress and we spent most of the visit discussing ways she might handle this. Tolerating medications well.      Review of Systems            Health Status   Allergies:    Allergic Reactions (Selected)  No Known Medication Allergies   Medications:  (Selected)   Prescriptions  Prescribed  Effexor  mg oral capsule, extended release: 1 cap(s) ( 150 mg ), PO, Daily, # 30 cap(s), 4 Refill(s), Type: Maintenance, Pharmacy: [facility] #, 1 cap(s) po daily  Neurontin 300 mg oral capsule: 1 cap(s) ( 300 mg ), po, hs, # 30 cap(s), 0 Refill(s), Type: Maintenance, Pharmacy: [facility] #0, 1 cap(s) po hs  ramelteon 8 mg oral tablet: 1 tab(s) ( 8 mg ), po, hs, PRN: as needed for insomnia, # 30 tab(s), 4 Refill(s), Type: Maintenance, Pharmacy: [facility] #, 1 tab(s) po hs,PRN:as needed for insomnia   Problem list:    All Problems  Obesity / SNOMED CT 4260965425 / Probable  Seasonal allergies / SNOMED CT 486267991 / Confirmed      Histories   Past Medical History:    Active  Seasonal allergies (525066530)  Resolved  History of chicken pox  ([id number]):  Resolved.   Family History:    Multiple sclerosis  Mother     Procedure history:    Arthroscopic repair of superior labrum anterior to posterior tear ([id number]) in the month of 8/2016 at 16 Years.  Comments:  10/18/2016 4:17 PM - Julio Alejandro CMA  Bilaterally  Extraction of wisdom tooth ([id number]).   Social History:        Alcohol Assessment: Denies Alcohol Use            Never      Tobacco Assessment: Denies Tobacco Use            Never smoker      Substance Abuse Assessment: Denies Substance Abuse            Never      Employment and Education Assessment            Student                     Comments:                      10/18/2016 - Javon Julio MARTINES                     Freshman at [employer]      Exercise and Physical Activity Assessment: Regular exercise            Exercise frequency: 5-6 times/week.  Exercise type: Track & Field at college.        Physical Examination   Vital Signs   5/3/2017 9:22 AM CDT Temperature Temporal 98.1 DegF    Peripheral Pulse Rate 72 bpm    Systolic Blood Pressure 112 mmHg    Diastolic Blood Pressure 80 mmHg    Mean Arterial Pressure 91 mmHg      Measurements from flowsheet : Measurements   5/3/2017 9:22 AM CDT Height/Length Estimated 70 in    Weight Measured - Standard 284 lb        Psych - calm, normal affect      Impression and Plan     Anxiety, depression, and insomnia  - continues to imrpove with counseling and medications (effexor and ramelteon).    Pt is finishing first meg at [employer], had rough 1st semester but much better now. Wants to be a doctor. She's struggled with a lot of family stress. Grandparents not in the greatest health, family farm that she needs to help out on, parents are . Mom is moving further away and wants Bailey to come but doesn't want to, cuasing a significant amount of stress.      Professional Services   Counseling Summary:  This was a 15 minute visit with greater than 50% of that time spent  counseling the patient.

## 2022-02-16 NOTE — PROGRESS NOTES
Patient:   JESUS YU            MRN: [id number]            FIN: [id number]               Age:   18 years     Sex:  Female     :  2000   Associated Diagnoses:   Brain concussion   Author:   Riky Pineda MD      Chief Complaint   2018 1:04 PM CDT     pt here for poss concussion, was hit in the head with a volleyball on friday-drove 2 hours home and felt very out of it, she continues to feel out of it, has a headache today, was nauseous on friday this has improved      History of Present Illness   see chief complaint as noted above and confirmed with the patient     18 year old female presents today with concern of possible concussion. On Monday afternoon she was sitting in the bleachers and she was hit in the head with a volleyball, the left side of her head. After she was hit she developed sudden onset of headache and feeling out of it. She had some nausea shortly after getting hit yet this subsided by the next morning. No vomiting, no changes in vision, no loss of consiousness. She has had concussions in the past and says this is the same feeling as she has had in the past. She is struggling to concentrate in class and she mentions after getting hit with a ball she drove two hours home and the whole time she felt out of it. On Tuesday she went to sleep at 5pm and slept until 8am. She say's she has finals coming up and is concerned she is not going to be able to focus, she took a test yesturday in class and say's she struggled to focus on what was going on. Also mentions feeling more emotional, she say's she was crying in class and is unsure why.She has Imitrex to take as needed for migraines, she did take this on the day she was hit, it helped some. She also is on Gabapentin at 300mg capsules two every night, she was initially prescribed 300mg capsules one tab at night due to last concussion. She currently is on it due to nerve damage from a recent ankle surgery, she say's she has another ankle  surgery in three weeks to take out the damaged nerves.       Review of Systems   Constitutional:  No fever.    Ear/Nose/Mouth/Throat:  No sore throat.    Respiratory:  No shortness of breath, No cough.    Gastrointestinal:  No nausea, No vomiting, No diarrhea.    Integumentary:  No rash.    Neurologic:  Headache.       Health Status   Allergies:    Allergic Reactions (Selected)  No Known Medication Allergies   Medications:  (Selected)   Prescriptions  Prescribed  Imitrex 100 mg oral tablet: 1 tab(s) ( 100 mg ), PO, Once, PRN: for migraine headache, # 9 tab(s), 2 Refill(s), Type: Soft Stop, Pharmacy: [facility] #[id number], 1 tab(s) po once,PRN:for migraine headache  Documented Medications  Documented  gabapentin 300 mg oral capsule: 2 cap(s) ( 600 mg ), po, hs, 0 Refill(s), Type: Maintenance   Problem list:    All Problems  Seasonal allergies / SNOMED CT 730235235 / Confirmed  Obesity / SNOMED CT 7520968224 / Probable  Resolved: History of chicken pox / SNOMED CT G533OHR9-3592--[id number]      Histories   Past Medical History:    Active  Seasonal allergies (342002703)  Resolved  History of chicken pox (Z194WGY9-0887--[id number]):  Resolved.   Family History:    Multiple sclerosis  Mother     Procedure history:    Arthroscopic repair of superior labrum anterior to posterior tear ([id number]) in the month of 8/2016 at 16 Years.  Comments:  10/18/2016 4:17 PM - Julio Alejandro CMA  Bilaterally  Extraction of wisdom tooth ([id number]).   Social History:        Alcohol Assessment            Current, 1-2 times per week, 2 drinks/episode average.  3 drinks/episode maximum.      Tobacco Assessment            Never smoker      Substance Abuse Assessment            Never      Employment and Education Assessment            Student                     Comments:                      10/18/2016 - Julio Alejandro CMA                     Freshman at [employer]      Exercise and Physical Activity Assessment             Exercise frequency: 5-6 times/week.  Exercise type: Track & Field at [employer] [place].        Physical Examination   Vital Signs   5/2/2018 1:04 PM CDT Temperature Tympanic 98.7 DegF    Peripheral Pulse Rate 95 bpm    Pulse Site Radial artery    Systolic Blood Pressure 124 mmHg    Diastolic Blood Pressure 74 mmHg    Mean Arterial Pressure 91 mmHg    BP Site Right arm    Oxygen Saturation 98 %      Measurements from flowsheet : Measurements   5/2/2018 1:04 PM CDT Height Measured - Standard 70 in    Weight Measured - Standard 289 lb    BSA 2.54 m2    Body Mass Index 41.46 kg/m2  HI      General:  Alert and oriented, No acute distress.    Eye:  Pupils are equal, round and reactive to light, Normal conjunctiva.    HENT:  Oral mucosa is moist.    Neck:  Supple.    Respiratory:  Respirations are non-labored.    Cardiovascular:  Normal rate, Regular rhythm, No edema.    Gastrointestinal:  Non-distended.    Musculoskeletal:  Normal gait.    Integumentary:  Warm, No rash.    Psychiatric:  Cooperative, Appropriate mood & affect, Normal judgment.       Review / Management   Results review      Impression and Plan       Diagnosis     Brain concussion (QXG02-ZL S06.0X9A).     Plan:  Advised home rest for the next three days, should refrain from studying and computer screen.     She is concerned as finals are next week, advised she talk with Floresita Barahona she is a nurse at the [employer] [facility] [employer] [place] and may be able to help her to possibly set them up when she starts feeling better, if she can rest for a few days would hope to then see some improvment.     She will take Imitrex as needed and will continue with her Gabapentin.     Advised she return if symptoms persist or worsen.     She has had concussions in the past and she understands that rest and brain rest is the best kind of healing for a concussion. .    IMeena Medical Assistant acted solely as a scribe for, and in presence of Dr. Riky Pineda who performed the services.

## 2022-02-16 NOTE — PROGRESS NOTES
Patient:   JESUS YU            MRN: [id number]            FIN: [id number]               Age:   17 years     Sex:  Female     :  2000   Associated Diagnoses:   Insomnia   Author:   Riky Pineda MD      Chief Complaint   2017 8:39 AM CDT    pt here for discussion of sleep, she states it is hard to get to sleep, not too hard to stay asleep but falling asleep is hard, this has been going on for a long time      History of Present Illness   see chief complaint as noted above and confirmed with the patient     17 year old female reffered by  with discussion of sleep. For years she has had troubles falling asleep. She typically goes to bed around 10 or 11pm and wakes up at 8 or 9am. On the weekends she usually goes to bed around 10 or 11pm and wakes up around 10am. She say's it is a good night if she can get to bed within an hour, once she fall's asleep she usually can stay asleep. Say's her roommate will wake her up if she is too loud and once she is woken up she will stay up. She denies snoring, denies gasping for air, denies daytime naps. She has tried some medications such as Ramelteon and found this did help some in the beginning but once she began taking it for a while she felt it was not helping to aid her to get to bed. She makes sure she is comfortable, makes sure lights are off, does not sit and play on phone, she does not feel worried when she goes to sleep.        Review of Systems   Constitutional:  No fever.    Respiratory:  No shortness of breath, No cough.    Integumentary:  No rash.    Neurologic:  Alert and oriented X4, No headache.             Health Status   Allergies:    Allergic Reactions (Selected)  No Known Medication Allergies   Medications:  (Selected)   Prescriptions  Prescribed  Neurontin 300 mg oral capsule: 1 cap(s) ( 300 mg ), po, hs, # 30 cap(s), 0 Refill(s), Type: Maintenance, Pharmacy: [facility] #[id number], 1 cap(s) po hs   Problem list:    All  Problems  Seasonal allergies / SNOMED CT 818858997 / Confirmed  Obesity / SNOMED CT 7984431108 / Probable  Resolved: History of chicken pox / SNOMED CT L212JOA8-6350--NL407G3991P6      Histories   Past Medical History:    Active  Seasonal allergies (846489391)  Resolved  History of chicken pox (Q305WAR5-4814--[id number]):  Resolved.   Family History:    Multiple sclerosis  Mother     Procedure history:    Arthroscopic repair of superior labrum anterior to posterior tear (6463526939) in the month of 8/2016 at 16 Years.  Comments:  10/18/2016 4:17 PM - Julio Alejandro CMA  Bilaterally  Extraction of wisdom tooth ([id number]).   Social History:        Alcohol Assessment            Current, 1-2 times per week, 2 drinks/episode average.  3 drinks/episode maximum.      Tobacco Assessment            Never smoker      Substance Abuse Assessment            Never      Employment and Education Assessment            Student                     Comments:                      10/18/2016 - Julio Alejandro CMA                     Freshman at [employer]      Exercise and Physical Activity Assessment            Exercise frequency: 5-6 times/week.  Exercise type: Track & Field at college.        Physical Examination   Vital Signs   9/18/2017 8:39 AM CDT Peripheral Pulse Rate 97 bpm    Pulse Site Radial artery    Systolic Blood Pressure 118 mmHg    Diastolic Blood Pressure 70 mmHg    Mean Arterial Pressure 86 mmHg    BP Site Right arm    Oxygen Saturation 98 %      Measurements from flowsheet : Measurements   9/18/2017 8:39 AM CDT Height Measured - Standard 70 in    Weight Measured - Standard 289 lb    BSA 2.54 m2    Body Mass Index 41.46 kg/m2    Body Mass Index Percentile 98.66      General:  Alert and oriented, No acute distress.    Eye:  Pupils are equal, round and reactive to light, Normal conjunctiva.    HENT:  Oral mucosa is moist.    Neck:  Supple.    Respiratory:  Respirations are non-labored.    Cardiovascular:   "Normal rate, Regular rhythm, No edema.    Gastrointestinal:  Non-distended.    Musculoskeletal:  Normal gait.    Integumentary:  Warm, No rash.    Neurologic:  Alert, Oriented.    Psychiatric:  Cooperative, Appropriate mood & affect, Normal judgment.       Review / Management   Results review      Impression and Plan       Diagnosis     Insomnia (EQG43-AZ G47.0).     Plan:  Attempt to stay awake until 11:30 or 12 then lie down at this time to see if this will help you to get to sleep. do not lie in bed waiting to go to sleep    Meditate: There are many forms of this, you can google it and there is many ways to meditate, this will help to relax the mind and calm it to help with sleep.     Can take Gabapentin \"Neurontin\" every night, this will help to prevent migraines and also may help to aid in falling asleep. Will send this in at 300mg caps once at night, depending on how it is working may increase to 600mg at night, just let us know how it is working.     Reffered to counseling    [place] will help in the morning. Should not be in the light two hours before you go to bed.     Please return if having troubles or no improvment      .    Summary:  Discussed good sleep hygiene-informed should try to go to bed a little later than she is going to sleep.     Discussed meditation    Discussed counseling      Discussed medications     .    Meena GARCIA Medical Assistant acted solely as a scribe for, and in presence of Dr. Riky Pineda who performed the services.  "

## 2022-02-16 NOTE — PROGRESS NOTES
Patient:   JESUS YU            MRN: [id number]            FIN: [id number]               Age:   16 years     Sex:  Female     :  2000   Associated Diagnoses:   Concussion   Author:   Riky Pineda MD      Chief Complaint   2017 10:47 AM CST   pt here for fu with concussion, headaches are slowly getting better, she has been resting, no nausea, no vomiting, has been attending one class, is able to do it but finds it requires more focus and she gets tired fast      History of Present Illness   see chief complaint as noted above and confirmed with the patient     16 year old female here today following up with diagnosis of concussion, she slipped and fell on ice. She was taken off of school for a few days to let her brain heal. She was having some nausea and headaches, she states the headaches are slowly getting better, she has been resting yet has been attending one class as she says she just cannot miss this one. While in class she noticed it was hard to focus and to keep up with the notes, also makes her feel tired, she feels she is still having some focusing issues. She is attending the environmental science class as she says if she misses it she will miss a lot of notes.       Review of Systems   Constitutional:  No fever.    Ear/Nose/Mouth/Throat:  No sore throat.    Respiratory:  No shortness of breath, No cough.    Cardiovascular:  No chest pain.    Gastrointestinal:  No nausea, No vomiting, No diarrhea.    Genitourinary:  Negative.    Musculoskeletal:  No back pain.    Integumentary:  No rash.    Neurologic:  Alert and oriented X4, Headache, No abnormal balance, No confusion.       Health Status   Allergies:    Allergic Reactions (Selected)  No Known Medication Allergies   Medications:  (Selected)   Prescriptions  Prescribed  Effexor XR 75 mg oral capsule, extended release: 1 cap(s) ( 75 mg ), PO, Daily, # 30 cap(s), 0 Refill(s), Type: Maintenance, Pharmacy: [facility] #[id number], 1 cap(s) po  daily  citalopram 20 mg oral tablet: 2 tab(s) ( 40 mg ), po, daily, # 60 tab(s), 0 Refill(s), Type: Soft Stop, Pharmacy: [facility] #[id number], 2 tab(s) po daily  ramelteon 8 mg oral tablet: 1 tab(s) ( 8 mg ), po, hs, PRN: as needed for insomnia, # 30 tab(s), 0 Refill(s), Type: Maintenance, Pharmacy: [facility] #[id number], 1 tab(s) po hs,PRN:as needed for insomnia   Problem list:    All Problems  Obesity / SNOMED CT 2415773141 / Probable  Seasonal allergies / SNOMED CT 945210428 / Confirmed  Resolved: History of chicken pox / SNOMED CT N170YAI9-9443--NG658C3585M0      Histories   Past Medical History:    Active  Seasonal allergies (740523719)  Resolved  History of chicken pox (Q590WBB3-9209--YO329H3020U3):  Resolved.   Family History:    Multiple sclerosis  Mother     Procedure history:    Arthroscopic repair of superior labrum anterior to posterior tear (6774864680) in the month of 8/2016 at 16 Years.  Comments:  10/18/2016 4:17 PM - Julio Alejandro CMA  Bilaterally  Extraction of wisdom tooth (783091608).   Social History:        Alcohol Assessment: Denies Alcohol Use            Never      Tobacco Assessment: Denies Tobacco Use            Never smoker      Substance Abuse Assessment: Denies Substance Abuse            Never      Employment and Education Assessment            Student                     Comments:                      10/18/2016 - Julio Alejandro CMA                     Freshman at [employer]      Exercise and Physical Activity Assessment: Regular exercise            Exercise frequency: 5-6 times/week.  Exercise type: Track & Field at college.        Physical Examination   Vital Signs   2/16/2017 10:47 AM CST Peripheral Pulse Rate 90 bpm    Pulse Site Radial artery    Systolic Blood Pressure 124 mmHg    Diastolic Blood Pressure 78 mmHg    Mean Arterial Pressure 93 mmHg    BP Site Right arm    Oxygen Saturation 98 %      Measurements from flowsheet : Measurements   2/16/2017 10:47 AM CST  Height Measured - Standard 70 in    Weight Measured - Standard 275.4 lb    BSA 2.48 m2    Body Mass Index 39.51 kg/m2    Body Mass Index Percentile 98.54      General:  Alert and oriented, No acute distress.    Eye:  Pupils are equal, round and reactive to light, Normal conjunctiva.    HENT:  Oral mucosa is moist.    Neck:  Supple.    Respiratory:  Respirations are non-labored.    Cardiovascular:  Normal rate, Regular rhythm, No edema.    Gastrointestinal:  Non-distended.    Musculoskeletal:  Normal gait.    Integumentary:  Warm, No rash.    Neurologic:  Alert, Oriented, Normal sensory, Normal motor function, No focal deficits.    Psychiatric:  Cooperative, Appropriate mood & affect, Normal judgment.       Review / Management   Results review      Impression and Plan       Diagnosis     Concussion (VGF56-SS S06.0).     Course:  Improving (but still having some symptoms).    Plan:  Need to rest more, can use tylenol and ice packs as needed.     If you could stay out of the class for a few more days this would be the best idea to help your brain completely heal.     No class until Monday if you can.     Please return when needed with , if still having the headaches please call us and we may put you on a medication to help if your willing.     .    Summary:  Discussed more home rest until Monday, patient is going to try to have her friend get notes from class so she is able to stay home.     Informed to call or come back in on Monday if still having headaches, may prescribe Neurontin if needed, patient will call or come in if she needs anything.     Disscussed at home rest and letting her brain rest.     Discussed what to watch for and when to return .    Meena GARCIA Medical Assistant acted solely as a scribe for, and in presence of Dr. Riky Pineda who performed the services.

## 2022-02-16 NOTE — PROGRESS NOTES
Patient:   JESUS YU            MRN: [id number]            FIN: [id number]               Age:   17 years     Sex:  Female     :  2000   Associated Diagnoses:   Diarrhea; Vomiting   Author:   Yuliet Flores      Chief Complaint   2018 2:54 PM CST    pt c/o watery diarrhea for the past 3 weeks, pt states has had couple episodes of vomiting and noticed blood, abdominal cramping , hard time keep fluids down, decreased appetite      History of Present Illness   PPC with complaints of 19d of watery diarrhea without blood or fever, no personal or fy hx of crohns, colitis or IBS  has not tried to treat this with any medication, denies new foods or supplements which may irritate colon  last night went out drinking and was in hot tub, became nauseated and started vomiting, vomitted today, has had abdominal cramping, trace blood in emesis  denies having diarrhea as soon as she eats, getting nauseated with fluids today  denies risk of pg as she has never been sexually active      Review of Systems   Constitutional:  Negative.    Ear/Nose/Mouth/Throat:  Negative.    Respiratory:  Negative.    Cardiovascular:  Negative.    Gastrointestinal:  Negative except as documented in history of present illness.    Genitourinary:  Negative.    Hematology/Lymphatics:  Negative.    Endocrine:  Negative.    Immunologic:  Negative.    Musculoskeletal:  Negative.    Integumentary:  Negative.    Neurologic:  Negative.    Psychiatric:  Negative.       Health Status   Allergies:    Allergic Reactions (Selected)  No Known Medication Allergies   Medications:  (Selected)   Prescriptions  Prescribed  DULoxetine 60 mg oral delayed release capsule: 1 cap(s) ( 60 mg ), po, daily, # 30 cap(s), 0 Refill(s), Type: Maintenance, Pharmacy: [email or web address] #[id number], 1 cap(s) po daily  Imitrex 100 mg oral tablet: 1 tab(s) ( 100 mg ), PO, Once, PRN: for migraine headache, # 9 tab(s), 2 Refill(s), Type: Soft Stop, Pharmacy: [email or web address] #[id number],  1 tab(s) po once,PRN:for migraine headache  Neurontin 300 mg oral capsule: 1 cap(s) ( 300 mg ), po, hs, # 30 cap(s), 1 Refill(s), Type: Maintenance, Pharmacy: [facility] #[id number], 1 cap(s) po hs   Problem list:    All Problems (Selected)  Seasonal allergies / SNOMED CT 131814618 / Confirmed  Obesity / SNOMED CT 5621674810 / Probable      Histories   Past Medical History:    Active  Seasonal allergies (093148797)  Resolved  History of chicken pox (W939SNU2-0910--[id number]):  Resolved.      Physical Examination   Vital Signs   2/11/2018 2:54 PM CST Temperature Tympanic 97.9 DegF    Peripheral Pulse Rate 76 bpm    Pulse Site Radial artery    HR Method Manual    Systolic Blood Pressure 140 mmHg  HI    Diastolic Blood Pressure 88 mmHg  HI    Mean Arterial Pressure 105 mmHg    BP Site Right arm    BP Method Manual      Measurements from flowsheet : Measurements   2/11/2018 2:54 PM CST    Weight Measured - Standard                299.6 lb     General:  Alert and oriented, No acute distress, able to move around without difficulty, able to climb on and off table without difficulty.    Eye:  Pupils are equal, round and reactive to light.    HENT:  Normocephalic.    Neck:  Supple, Non-tender.    Respiratory:  Lungs are clear to auscultation.    Cardiovascular:  Normal rate.    Gastrointestinal:  Soft, Non-tender, Non-distended, Normal bowel sounds, No organomegaly.    Genitourinary:  No costovertebral angle tenderness.    Lymphatics:  No lymphadenopathy neck, axilla, groin.    Musculoskeletal:  Normal range of motion.    Integumentary:  Warm, Dry.    Neurologic:  Alert, Oriented, Normal sensory.    Psychiatric:  Cooperative, Appropriate mood & affect.        4mg zofran ODT given in clinic: no vomiting but feels dizzy      Review / Management   Results review:  Lab results   2/11/2018 3:44 PM CST Sodium Level 144 mmol/L    Potassium Level 4.1 mmol/L    Chloride Level 109 mmol/L    CO2 Level 24 mmol/L    AGAP 11     Glucose Level 96 mg/dL    BUN 10 mg/dL    Creatinine Level 0.75 mg/dL    BUN/Creat Ratio 13    eGFR  >60    eGFR Non-African American >60    Calcium Level 9.5 mg/dL    Amylase Level 45 IU/L   .       Impression and Plan   Diagnosis     Diarrhea (XTJ79-WO R19.7).     Vomiting (QDD24-TE R11.10).     Plan:  two concerns  protracted diarrhea of unkown cause:  stool cultures ordered and collection kit given to pt  hydration is imperative, BMP was normal which is reassuring but I cannot explain cause of diarrhea    vomiting:  i suspect with length time of diarrhea, alcohol use and sitting in hot tub pt became nauseated and vomitting ensued  pt tolerated 4mg zofran but now feels dizzy and would like IV fluids, unforutnately we cannot provide this in [facility] and she will needs to go to [facility] ED  I have explained to pt if she is not vomiting I can supply her with zofran to use in dorm and she can focus on hydration and avoid the ED but she would like to be seen in ED today    report given to Bridget MÉNDEZ [facility] ED.

## 2022-02-16 NOTE — PROGRESS NOTES
Patient:   JESUS YU            MRN: [id number]            FIN: [id number]               Age:   16 years     Sex:  Female     :  2000   Associated Diagnoses:   Concussion   Author:   Riky Pineda MD      Chief Complaint   2017 10:53 AM CST   f/u concussion. She is feeling a lot better      History of Present Illness   see chief complaint as noted above and confirmed with the patient   16 year old female following up her concussion.    17: concussion that occured about two weeks ago. She was getting much better, her headaches were slowly getting better. On Monday she felt really good her headaches were pretty much gone, she went to [facility] [place] and did okay. On Tuesday she was told by her  if she can go the day symptom free then she could start biking soon, on Tuesday night she was with her friends and she got a bad headache so she went to lie down, when she did go to sleep she say's she slept for seventeen hours,  then on Wednesday night she only slept for about one hour. She feels the headaches are still present, she has been taking aleve and says this gives her some relief for a few hours.    17: She is feeling a lot better today. She was able to study and watch tv for a few hours last night without any problems. She denies headaches, and her mind doesn't feel as fuzzy. Started prednisone and gabapentin last week.         Review of Systems   Gastrointestinal:  No nausea, No vomiting.    Musculoskeletal:  No back pain.    Neurologic:  No headache.    Psychiatric:  No memory difficulties.             Health Status   Allergies:    Allergic Reactions (Selected)  No Known Medication Allergies   Medications:  (Selected)   Prescriptions  Prescribed  Effexor  mg oral capsule, extended release: 1 cap(s) ( 150 mg ), PO, Daily, # 30 cap(s), 0 Refill(s), Type: Maintenance, Pharmacy: [facility] #[id number], 1 cap(s) po daily  Neurontin 300 mg oral capsule: 1 cap(s) ( 300 mg ), po,  hs, # 30 cap(s), 0 Refill(s), Type: Maintenance, Pharmacy: [facility] #[id number], 1 cap(s) po hs  predniSONE 20 mg oral tablet: 1 tab(s) ( 20 mg ), po, daily, # 5 tab(s), 0 Refill(s), Type: Maintenance, Pharmacy: [facility] #[id number], 1 tab(s) po daily,x5 day(s)  ramelteon 8 mg oral tablet: 1 tab(s) ( 8 mg ), po, hs, PRN: as needed for insomnia, # 30 tab(s), 0 Refill(s), Type: Maintenance, Pharmacy: [facility] #[id number], 1 tab(s) po hs,PRN:as needed for insomnia   Problem list:    All Problems  Seasonal allergies / SNOMED CT 761642243 / Confirmed  Obesity / SNOMED CT 3835382873 / Probable  Resolved: History of chicken pox / SNOMED CT [id number]--[id number]      Histories   Past Medical History:    Active  Seasonal allergies (679560664)  Resolved  History of chicken pox ([id number]--[id number]):  Resolved.   Family History:    Multiple sclerosis  Mother     Procedure history:    Arthroscopic repair of superior labrum anterior to posterior tear (0545922123) in the month of 8/2016 at 16 Years.  Comments:  10/18/2016 4:17 PM - Julio Alejandro CMA  Bilaterally  Extraction of wisdom tooth (897842798).   Social History:        Alcohol Assessment: Denies Alcohol Use            Never      Tobacco Assessment: Denies Tobacco Use            Never smoker      Substance Abuse Assessment: Denies Substance Abuse            Never      Employment and Education Assessment            Student                     Comments:                      10/18/2016 - Julio Alejandro CMA                     Freshman at [employer]      Exercise and Physical Activity Assessment: Regular exercise            Exercise frequency: 5-6 times/week.  Exercise type: Track & Field at college.        Physical Examination   Vital Signs   2/28/2017 10:53 AM CST Peripheral Pulse Rate 72 bpm    Systolic Blood Pressure 124 mmHg    Diastolic Blood Pressure 70 mmHg    Mean Arterial Pressure 88 mmHg      Measurements from flowsheet : Measurements    2/28/2017 10:53 AM CST Height Measured - Standard 70 in    Weight Measured - Standard 276 lb    BSA 2.48 m2    Body Mass Index 39.6 kg/m2    Body Mass Index Percentile 98.55      General:  Alert and oriented, No acute distress.    Eye:  Pupils are equal, round and reactive to light, Normal conjunctiva.    HENT:  Oral mucosa is moist.    Neck:  Supple.    Respiratory:  Respirations are non-labored.    Cardiovascular:  Normal rate, Regular rhythm, No edema.    Gastrointestinal:  Non-distended.    Musculoskeletal:  Normal gait.    Integumentary:  Warm, No rash.    Psychiatric:  Cooperative, Appropriate mood & affect, Normal judgment.       Review / Management   Results review      Impression and Plan   Diagnosis     Concussion (CEQ30-XY S06.0X9A).     Plan:  Will have her continue gabapentin until she no longer gets headaches. She can call if she needs refills. Discussed that she should slowly go back to her normal dalily activities. Reviewed expected course, what to watch for, and when to return.   I, Hannah Lopez [facility], acted solely as a scribe for, and in the presence of Dr. Riky Pineda who performed the service..

## 2022-02-16 NOTE — PROGRESS NOTES
Patient:   JESUS YU            MRN: [id number]            FIN: [id number]               Age:   17 years     Sex:  Female     :  2000   Associated Diagnoses:   Headache; Anxiety; Insomnia   Author:   Riky Pineda MD      Chief Complaint      History of Present Illness   see chief complaint as noted above and confirmed with the patient  17 year old female following up sleep issues     17 : 17 year old female reffered by  with discussion of sleep. For years she has had troubles falling asleep. She typically goes to bed around 10 or 11pm and wakes up at 8 or 9am. On the weekends she usually goes to bed around 10 or 11pm and wakes up around 10am. She say's it is a good night if she can get to bed within an hour, once she fall's asleep she usually can stay asleep. Say's her roommate will wake her up if she is too loud and once she is woken up she will stay up. She denies snoring, denies gasping for air, denies daytime naps. She has tried some medications such as Ramelteon and found this did help some in the beginning but once she began taking it for a while she felt it was not helping to aid her to get to bed. She makes sure she is comfortable, makes sure lights are off, does not sit and play on phone, she does not feel worried when she goes to sleep.         17: She feel her sleep is improving.  She is falling asleep better but does wake up frequent during the night. She is having problems with her roommate and this is stressing her out a lot. Her room mate does things at night that wake her up. The sleep she does get is poor.  She is waking up with migraines. She takes tylenol and that helps for the smaller migraines but not all of them. They are frequent. They do affect her vision. She does have an ankle injury but doesn't feel that pain is affecting her sleep.         Review of Systems   Constitutional:  Negative.    Eye:  Visual disturbances.    Ear/Nose/Mouth/Throat:  Negative.     Respiratory:  Negative.    Cardiovascular:  Negative.    Gastrointestinal:  Negative.    Musculoskeletal:  Negative.    Integumentary:  Negative.    Neurologic:  Headache.    Psychiatric:  Sleeping problems.              Health Status   Allergies:    Allergic Reactions (Selected)  No Known Medication Allergies   Medications:  (Selected)   Prescriptions  Prescribed  DULoxetine 60 mg oral delayed release capsule: 1 cap(s) ( 60 mg ), po, daily, # 30 cap(s), 0 Refill(s), Type: Maintenance, Pharmacy: [facility] #[id number], 1 cap(s) po daily  Imitrex 100 mg oral tablet: 1 tab(s) ( 100 mg ), PO, Once, PRN: for migraine headache, # 9 tab(s), 2 Refill(s), Type: Soft Stop, Pharmacy: [facility] #[id number], 1 tab(s) po once,PRN:for migraine headache  Neurontin 300 mg oral capsule: 1 cap(s) ( 300 mg ), po, hs, # 30 cap(s), 1 Refill(s), Type: Maintenance, Pharmacy: [facility] #[id number], 1 cap(s) po hs  Documented Medications  Documented  traMADol 50 mg oral tablet: 1 tab(s) ( 50 mg ), PO, q4hr, PRN: for pain, # 60 tab(s), 0 Refill(s), Type: Maintenance   Problem list:    All Problems  Obesity / [id number] / Probable  Seasonal allergies / 910652995 / Confirmed  Resolved: History of chicken pox / [id number]--[id number]      Histories   Past Medical History:    Active  Seasonal allergies (890564424)  Resolved  History of chicken pox ([id number]--[id number]):  Resolved.   Family History:    Multiple sclerosis  Mother     Procedure history:    Arthroscopic repair of superior labrum anterior to posterior tear (SNOMED CT 5524885663) in the month of 8/2016 at 16 Years.  Comments:  10/18/2016 4:17 PM - Julio Alejandro CMA  Bilaterally  Extraction of wisdom tooth (SNOMED CT 589753448).   Social History:        Alcohol Assessment            Current, 1-2 times per week, 2 drinks/episode average.  3 drinks/episode maximum.      Tobacco Assessment            Never smoker      Substance Abuse Assessment             Never      Employment and Education Assessment            Student                     Comments:                      10/18/2016 - Javon MARTINES, Julio                     Freshman at [employer]      Exercise and Physical Activity Assessment            Exercise frequency: 5-6 times/week.  Exercise type: Track & Field at college.        Physical Examination   VS/Measurements   General:  Alert and oriented, No acute distress.    Eye:  Pupils are equal, round and reactive to light, Normal conjunctiva.    HENT:  Oral mucosa is moist.    Neck:  Supple.    Respiratory:  Respirations are non-labored.    Cardiovascular:  Normal rate, Regular rhythm, No edema.    Gastrointestinal:  Non-distended.    Musculoskeletal:  Normal gait.    Integumentary:  Warm, No rash.    Psychiatric:  Cooperative, Appropriate mood & affect, Normal judgment.       Review / Management   Results review      Impression and Plan   Diagnosis     Headache (MRH26-PK R51).     Anxiety (JXS85-IQ F41.9).     Insomnia (AHP86-FL G47.00).     Plan:  Discussed doing a home sleep study.  high risk for NELDA with bmi and known snoring.   she has nonrestorative sleep   Start imitrex and duloxetine for migraines. duloxetine may help anxiety  OK to continue to take tylenol or ibuprofen with medications. Discussed how to take the medications. Will have her follow up in 3-4 weeks  Hannah GARCIA [facility], acted solely as a scribe for, and in the presence of Dr. Riky Pineda who performed the service..